# Patient Record
Sex: FEMALE | Race: WHITE | URBAN - METROPOLITAN AREA
[De-identification: names, ages, dates, MRNs, and addresses within clinical notes are randomized per-mention and may not be internally consistent; named-entity substitution may affect disease eponyms.]

---

## 2018-01-17 ENCOUNTER — TELEPHONE (OUTPATIENT)
Dept: PEDIATRICS | Facility: CLINIC | Age: 53
End: 2018-01-17

## 2018-01-17 NOTE — TELEPHONE ENCOUNTER
"Patient Communication Preferences indicate  Do not contact  and/or communication by \"Phone\" is not preferred. Call not required per Outreach team.]      Outreach ,  Deja Rucker     "

## 2018-06-18 ENCOUNTER — HEALTH MAINTENANCE LETTER (OUTPATIENT)
Age: 53
End: 2018-06-18

## 2025-09-01 ENCOUNTER — APPOINTMENT (OUTPATIENT)
Dept: MRI IMAGING | Facility: CLINIC | Age: 60
End: 2025-09-01

## 2025-09-01 ENCOUNTER — HOSPITAL ENCOUNTER (EMERGENCY)
Facility: CLINIC | Age: 60
Discharge: ANOTHER HOSPITAL | End: 2025-09-01
Attending: EMERGENCY MEDICINE | Admitting: EMERGENCY MEDICINE

## 2025-09-01 VITALS
DIASTOLIC BLOOD PRESSURE: 65 MMHG | SYSTOLIC BLOOD PRESSURE: 112 MMHG | WEIGHT: 125 LBS | BODY MASS INDEX: 22.15 KG/M2 | OXYGEN SATURATION: 95 % | HEART RATE: 90 BPM | TEMPERATURE: 98.4 F | HEIGHT: 63 IN | RESPIRATION RATE: 16 BRPM

## 2025-09-01 DIAGNOSIS — R20.0 NUMBNESS AND TINGLING OF BOTH LEGS BELOW KNEES: Primary | ICD-10-CM

## 2025-09-01 DIAGNOSIS — R42 VERTIGO: ICD-10-CM

## 2025-09-01 DIAGNOSIS — R26.81 GAIT INSTABILITY: ICD-10-CM

## 2025-09-01 DIAGNOSIS — M48.02 FORAMINAL STENOSIS OF CERVICAL REGION: ICD-10-CM

## 2025-09-01 DIAGNOSIS — R20.0 NUMBNESS AND TINGLING IN BOTH HANDS: ICD-10-CM

## 2025-09-01 DIAGNOSIS — R20.2 NUMBNESS AND TINGLING IN BOTH HANDS: ICD-10-CM

## 2025-09-01 DIAGNOSIS — R20.2 NUMBNESS AND TINGLING OF BOTH LEGS BELOW KNEES: Primary | ICD-10-CM

## 2025-09-01 DIAGNOSIS — M47.816 SPONDYLOSIS OF LUMBAR SPINE: ICD-10-CM

## 2025-09-01 LAB
ALBUMIN SERPL BCG-MCNC: 4.4 G/DL (ref 3.5–5.2)
ALP SERPL-CCNC: 145 U/L (ref 40–150)
ALT SERPL W P-5'-P-CCNC: 76 U/L (ref 0–50)
AST SERPL W P-5'-P-CCNC: 142 U/L (ref 0–45)
BILIRUB SERPL-MCNC: 1.1 MG/DL
BILIRUBIN DIRECT (ROCHE PRO & PURE): 0.4 MG/DL (ref 0–0.45)
CRP SERPL-MCNC: <3 MG/L
ERYTHROCYTE [SEDIMENTATION RATE] IN BLOOD BY WESTERGREN METHOD: 5 MM/HR (ref 0–30)
ETHANOL SERPL-MCNC: <0.01 G/DL
MAGNESIUM SERPL-MCNC: 1.8 MG/DL (ref 1.7–2.3)
PROT SERPL-MCNC: 7.1 G/DL (ref 6.4–8.3)

## 2025-09-01 PROCEDURE — 70544 MR ANGIOGRAPHY HEAD W/O DYE: CPT

## 2025-09-01 PROCEDURE — 82077 ASSAY SPEC XCP UR&BREATH IA: CPT

## 2025-09-01 PROCEDURE — 72158 MRI LUMBAR SPINE W/O & W/DYE: CPT

## 2025-09-01 PROCEDURE — 83735 ASSAY OF MAGNESIUM: CPT

## 2025-09-01 PROCEDURE — 36415 COLL VENOUS BLD VENIPUNCTURE: CPT

## 2025-09-01 PROCEDURE — 86140 C-REACTIVE PROTEIN: CPT

## 2025-09-01 PROCEDURE — 72157 MRI CHEST SPINE W/O & W/DYE: CPT

## 2025-09-01 PROCEDURE — 85652 RBC SED RATE AUTOMATED: CPT

## 2025-09-01 PROCEDURE — 255N000002 HC RX 255 OP 636

## 2025-09-01 PROCEDURE — A9585 GADOBUTROL INJECTION: HCPCS

## 2025-09-01 PROCEDURE — 70553 MRI BRAIN STEM W/O & W/DYE: CPT

## 2025-09-01 PROCEDURE — 72156 MRI NECK SPINE W/O & W/DYE: CPT

## 2025-09-01 PROCEDURE — 99285 EMERGENCY DEPT VISIT HI MDM: CPT | Mod: 25 | Performed by: EMERGENCY MEDICINE

## 2025-09-01 PROCEDURE — 80076 HEPATIC FUNCTION PANEL: CPT

## 2025-09-01 PROCEDURE — 87798 DETECT AGENT NOS DNA AMP: CPT

## 2025-09-01 PROCEDURE — 70549 MR ANGIOGRAPH NECK W/O&W/DYE: CPT

## 2025-09-01 PROCEDURE — 86618 LYME DISEASE ANTIBODY: CPT

## 2025-09-01 RX ORDER — GADOBUTROL 604.72 MG/ML
0.1 INJECTION INTRAVENOUS ONCE
Status: COMPLETED | OUTPATIENT
Start: 2025-09-01 | End: 2025-09-01

## 2025-09-01 RX ADMIN — GADOBUTROL 10 ML: 604.72 INJECTION INTRAVENOUS at 16:14

## 2025-09-01 ASSESSMENT — ENCOUNTER SYMPTOMS
VOMITING: 0
WEAKNESS: 1
SPEECH DIFFICULTY: 0
ABDOMINAL PAIN: 0
HEADACHES: 0
DIARRHEA: 0
EYES NEGATIVE: 1
COUGH: 0
NUMBNESS: 1
NAUSEA: 1
SHORTNESS OF BREATH: 0
DIZZINESS: 1

## 2025-09-01 ASSESSMENT — ACTIVITIES OF DAILY LIVING (ADL)
ADLS_ACUITY_SCORE: 41

## 2025-09-01 ASSESSMENT — COLUMBIA-SUICIDE SEVERITY RATING SCALE - C-SSRS
1. IN THE PAST MONTH, HAVE YOU WISHED YOU WERE DEAD OR WISHED YOU COULD GO TO SLEEP AND NOT WAKE UP?: NO
6. HAVE YOU EVER DONE ANYTHING, STARTED TO DO ANYTHING, OR PREPARED TO DO ANYTHING TO END YOUR LIFE?: NO
2. HAVE YOU ACTUALLY HAD ANY THOUGHTS OF KILLING YOURSELF IN THE PAST MONTH?: NO

## 2025-09-02 LAB
A PHAGOCYTOPH DNA BLD QL NAA+PROBE: NOT DETECTED
B BURGDOR IGG+IGM SER QL: 0.6
BABESIA DNA BLD QL NAA+PROBE: NOT DETECTED
EHRLICHIA DNA SPEC QL NAA+PROBE: NOT DETECTED